# Patient Record
Sex: FEMALE | Race: WHITE | NOT HISPANIC OR LATINO | URBAN - METROPOLITAN AREA
[De-identification: names, ages, dates, MRNs, and addresses within clinical notes are randomized per-mention and may not be internally consistent; named-entity substitution may affect disease eponyms.]

---

## 2023-02-03 PROBLEM — F41.9 ANXIETY: Status: ACTIVE | Noted: 2023-02-03

## 2023-02-03 PROBLEM — J45.909 ASTHMA (HHS-HCC): Status: ACTIVE | Noted: 2023-02-03

## 2023-02-03 PROBLEM — J20.9 ACUTE BRONCHITIS WITH BRONCHOSPASM: Status: ACTIVE | Noted: 2023-02-03

## 2023-02-03 PROBLEM — G47.00 INSOMNIA: Status: ACTIVE | Noted: 2023-02-03

## 2023-02-03 PROBLEM — J30.2 SEASONAL ALLERGIES: Status: ACTIVE | Noted: 2023-02-03

## 2023-02-03 PROBLEM — K21.9 ESOPHAGEAL REFLUX: Status: ACTIVE | Noted: 2023-02-03

## 2023-02-03 PROBLEM — D50.9 IRON DEFICIENCY ANEMIA: Status: ACTIVE | Noted: 2023-02-03

## 2023-02-03 PROBLEM — E03.9 HYPOTHYROIDISM: Status: ACTIVE | Noted: 2023-02-03

## 2023-02-03 PROBLEM — E78.5 HYPERLIPIDEMIA: Status: ACTIVE | Noted: 2023-02-03

## 2023-02-03 RX ORDER — SERTRALINE HYDROCHLORIDE 25 MG/1
1 TABLET, FILM COATED ORAL DAILY
COMMUNITY
Start: 2019-01-18 | End: 2023-03-17 | Stop reason: SDUPTHER

## 2023-02-03 RX ORDER — ALBUTEROL SULFATE 90 UG/1
2 AEROSOL, METERED RESPIRATORY (INHALATION)
COMMUNITY
Start: 2020-01-21 | End: 2023-03-17 | Stop reason: SDUPTHER

## 2023-02-03 RX ORDER — LEVOTHYROXINE SODIUM 137 UG/1
1 TABLET ORAL DAILY
COMMUNITY
Start: 2019-05-08 | End: 2023-03-17 | Stop reason: SDUPTHER

## 2023-02-03 RX ORDER — ACETAMINOPHEN 500 MG
1 TABLET ORAL NIGHTLY
COMMUNITY
Start: 2020-01-21

## 2023-02-03 RX ORDER — FLUTICASONE PROPIONATE 50 MCG
2 SPRAY, SUSPENSION (ML) NASAL NIGHTLY
COMMUNITY
Start: 2020-01-21

## 2023-02-03 RX ORDER — SPIRONOLACT/HYDROCHLOROTHIAZID 25 MG-25MG
1 TABLET ORAL
COMMUNITY
Start: 2020-01-21

## 2023-03-17 ENCOUNTER — APPOINTMENT (OUTPATIENT)
Dept: PRIMARY CARE | Facility: CLINIC | Age: 58
End: 2023-03-17
Payer: COMMERCIAL

## 2023-03-17 ENCOUNTER — OFFICE VISIT (OUTPATIENT)
Dept: PRIMARY CARE | Facility: CLINIC | Age: 58
End: 2023-03-17

## 2023-03-17 VITALS
TEMPERATURE: 96.4 F | BODY MASS INDEX: 27.49 KG/M2 | WEIGHT: 192 LBS | OXYGEN SATURATION: 97 % | RESPIRATION RATE: 16 BRPM | HEART RATE: 76 BPM | HEIGHT: 70 IN | DIASTOLIC BLOOD PRESSURE: 84 MMHG | SYSTOLIC BLOOD PRESSURE: 142 MMHG

## 2023-03-17 DIAGNOSIS — Z12.31 BREAST CANCER SCREENING BY MAMMOGRAM: ICD-10-CM

## 2023-03-17 DIAGNOSIS — Z12.11 COLON CANCER SCREENING: ICD-10-CM

## 2023-03-17 DIAGNOSIS — Z13.1 DIABETES MELLITUS SCREENING: ICD-10-CM

## 2023-03-17 DIAGNOSIS — F41.9 ANXIETY: ICD-10-CM

## 2023-03-17 DIAGNOSIS — Z11.59 ENCOUNTER FOR HEPATITIS C SCREENING TEST FOR LOW RISK PATIENT: ICD-10-CM

## 2023-03-17 DIAGNOSIS — E03.9 HYPOTHYROIDISM, UNSPECIFIED TYPE: ICD-10-CM

## 2023-03-17 DIAGNOSIS — E78.2 MIXED HYPERLIPIDEMIA: ICD-10-CM

## 2023-03-17 DIAGNOSIS — D50.8 OTHER IRON DEFICIENCY ANEMIA: ICD-10-CM

## 2023-03-17 DIAGNOSIS — Z00.00 HEALTHCARE MAINTENANCE: Primary | ICD-10-CM

## 2023-03-17 DIAGNOSIS — Z11.4 ENCOUNTER FOR SCREENING FOR HIV: ICD-10-CM

## 2023-03-17 DIAGNOSIS — J45.909 ASTHMA, UNSPECIFIED ASTHMA SEVERITY, UNSPECIFIED WHETHER COMPLICATED, UNSPECIFIED WHETHER PERSISTENT (HHS-HCC): ICD-10-CM

## 2023-03-17 PROBLEM — J20.9 ACUTE BRONCHITIS WITH BRONCHOSPASM: Status: RESOLVED | Noted: 2023-02-03 | Resolved: 2023-03-17

## 2023-03-17 PROCEDURE — 99213 OFFICE O/P EST LOW 20 MIN: CPT

## 2023-03-17 PROCEDURE — 99396 PREV VISIT EST AGE 40-64: CPT

## 2023-03-17 PROCEDURE — 1036F TOBACCO NON-USER: CPT

## 2023-03-17 RX ORDER — SERTRALINE HYDROCHLORIDE 25 MG/1
25 TABLET, FILM COATED ORAL DAILY
Qty: 90 TABLET | Refills: 3 | Status: SHIPPED | OUTPATIENT
Start: 2023-03-17 | End: 2023-04-27 | Stop reason: SDUPTHER

## 2023-03-17 RX ORDER — LEVOTHYROXINE SODIUM 137 UG/1
137 TABLET ORAL
Qty: 90 TABLET | Refills: 3 | Status: SHIPPED | OUTPATIENT
Start: 2023-03-17 | End: 2024-03-18 | Stop reason: SDUPTHER

## 2023-03-17 RX ORDER — ALBUTEROL SULFATE 90 UG/1
2 AEROSOL, METERED RESPIRATORY (INHALATION) EVERY 4 HOURS PRN
Qty: 18 G | Refills: 3 | Status: SHIPPED | OUTPATIENT
Start: 2023-03-17 | End: 2024-03-18 | Stop reason: SDUPTHER

## 2023-03-17 SDOH — ECONOMIC STABILITY: FOOD INSECURITY: WITHIN THE PAST 12 MONTHS, YOU WORRIED THAT YOUR FOOD WOULD RUN OUT BEFORE YOU GOT MONEY TO BUY MORE.: NEVER TRUE

## 2023-03-17 SDOH — ECONOMIC STABILITY: FOOD INSECURITY: WITHIN THE PAST 12 MONTHS, THE FOOD YOU BOUGHT JUST DIDN'T LAST AND YOU DIDN'T HAVE MONEY TO GET MORE.: NEVER TRUE

## 2023-03-17 SDOH — HEALTH STABILITY: PHYSICAL HEALTH: ON AVERAGE, HOW MANY DAYS PER WEEK DO YOU ENGAGE IN MODERATE TO STRENUOUS EXERCISE (LIKE A BRISK WALK)?: 5 DAYS

## 2023-03-17 SDOH — HEALTH STABILITY: PHYSICAL HEALTH: ON AVERAGE, HOW MANY MINUTES DO YOU ENGAGE IN EXERCISE AT THIS LEVEL?: 30 MIN

## 2023-03-17 ASSESSMENT — ENCOUNTER SYMPTOMS
HEMATOLOGIC/LYMPHATIC NEGATIVE: 1
PSYCHIATRIC NEGATIVE: 1
MUSCULOSKELETAL NEGATIVE: 1
CONSTITUTIONAL NEGATIVE: 1
GASTROINTESTINAL NEGATIVE: 1
RESPIRATORY NEGATIVE: 1
CARDIOVASCULAR NEGATIVE: 1
EYES NEGATIVE: 1
ENDOCRINE NEGATIVE: 1
NEUROLOGICAL NEGATIVE: 1
STRESS: 1

## 2023-03-17 ASSESSMENT — PATIENT HEALTH QUESTIONNAIRE - PHQ9
SUM OF ALL RESPONSES TO PHQ9 QUESTIONS 1 & 2: 0
2. FEELING DOWN, DEPRESSED OR HOPELESS: NOT AT ALL
1. LITTLE INTEREST OR PLEASURE IN DOING THINGS: NOT AT ALL

## 2023-03-17 ASSESSMENT — PAIN SCALES - GENERAL: PAINLEVEL: 0-NO PAIN

## 2023-03-17 ASSESSMENT — LIFESTYLE VARIABLES
AUDIT-C TOTAL SCORE: 1
HOW OFTEN DO YOU HAVE SIX OR MORE DRINKS ON ONE OCCASION: NEVER
HOW MANY STANDARD DRINKS CONTAINING ALCOHOL DO YOU HAVE ON A TYPICAL DAY: 1 OR 2
HOW OFTEN DO YOU HAVE A DRINK CONTAINING ALCOHOL: MONTHLY OR LESS
SKIP TO QUESTIONS 9-10: 1

## 2023-03-17 NOTE — PATIENT INSTRUCTIONS
Thank you for coming to see me today.  If you have any questions or concerns following our visit, please contact the office.  Phone: [phone]    Follow up with me in 1 year or sooner as needed    1)  INCREASE sertraline to 1 whole tablet (25mg) per day.  If you start to feel more tired on the full tablet, call me and I can switch you to another medication    2) Get fasting lab work within the next 1-2 weeks.  The lab is down the foley from our office.     3) Please bring a copy of your mammogram report from [facility] to our office to be scanned into your medical record.      4) Check blood pressures at home 3-4 times per week, making sure you rest 5 minutes prior to taking a reading.  Write the readings down on blood pressure log and bring this log to your next visit with me. I think high blood pressure is due to high stress/anxiety.  Lets work together to get anxiety better controlled. If anxiety levels are controlled and your blood pressure is still high (top number 140 or higher consistently, bottom number 90 or higher consistently) please call me to let me know    5) Please get screening colonoscopy as you're able- call [phone] to schedule

## 2023-03-17 NOTE — PROGRESS NOTES
Subjective   Patient ID: Ebony Glez is a 57 y.o. female who presents for yearly annual follow up.    Stress  This is a chronic problem. The current episode started more than 1 year ago. The problem occurs constantly. The problem has been waxing and waning. The symptoms are aggravated by stress. She has tried relaxation (SSRI) for the symptoms. The treatment provided moderate relief.   Has severe panic attacks once per month,     Diet: Eating well balanced meal, thinks she should eat healthier.  Sweet thing a few times per week. 3 cups coffee in the morning, 4 cups makes her too jittery/nervous  Exercise: No regular exercise, extreme   Weight: Would like to lose weight, otherwise has been stable  Water: Carries water bottle around with her but still not drinking enough. Drinking 1 16 oz bottle per day  Sleep: Waking up around 3/4AM due to anxiety. Worse over the last 2 months. Getting about 7 hours sleep per night. Was taking naps  Social: , lives in a 2 floor home. 3 children, 4 grandchildren who live nearby  Professional: Self-employed, helping  with [employer].  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    HENT: Negative.     Eyes: Negative.    Respiratory: Negative.     Cardiovascular: Negative.    Gastrointestinal: Negative.    Endocrine: Negative.    Genitourinary: Negative.    Musculoskeletal: Negative.    Skin: Negative.    Neurological: Negative.    Hematological: Negative.    Psychiatric/Behavioral: Negative.          Current Outpatient Medications   Medication Sig Dispense Refill    ruiz mhbawa-Hm-qrdCuhglsyfc-tea (Apple Cider Vinegar Plus) [phone] mg-mcg-mg-mg tablet Take 1 tablet by mouth. 2 times daily      fluticasone (Flonase) 50 mcg/actuation nasal spray Administer 2 sprays into affected nostril(s) once daily at bedtime. INTRANASALLY      melatonin 5 mg tablet Take 1 tablet (5 mg) by mouth once  "daily at bedtime.      NON FORMULARY CBD  MG ONCE DAILY      albuterol 90 mcg/actuation inhaler Inhale 2 puffs every 4 hours if needed for wheezing. 4 times daily 18 g 3    levothyroxine (Synthroid, Levoxyl) 137 mcg tablet Take 1 tablet (137 mcg) by mouth once daily in the morning. Take before meals. 90 tablet 3    sertraline (Zoloft) 25 mg tablet Take 1 tablet (25 mg) by mouth once daily. 90 tablet 3     No current facility-administered medications for this visit.     Past Surgical History:   Procedure Laterality Date    OTHER SURGICAL HISTORY  01/21/2020    Dilation and curettage    OTHER SURGICAL HISTORY  01/21/2020    Bladder surgery    OTHER SURGICAL HISTORY  08/21/2020    Tubal ligation     Family History   Problem Relation Name Age of Onset    Hypertension Mother      Lung disease Father        Social History     Tobacco Use    Smoking status: Never    Smokeless tobacco: Never   Vaping Use    Vaping status: Never Used     Passive vaping exposure: Yes   Substance Use Topics    Alcohol use: Yes     Alcohol/week: 1.0 standard drink of alcohol     Types: 1 Glasses of wine per week    Drug use: Never        Objective     Visit Vitals  /84 (BP Location: Left arm, Patient Position: Sitting, BP Cuff Size: Adult)   Pulse 76   Temp 35.8 °C (96.4 °F) (Temporal)   Resp 16   Ht 1.778 m (5' 10\")   Wt 87.1 kg (192 lb)   SpO2 97%   BMI 27.55 kg/m²   Smoking Status Never   BSA 2.07 m²        Physical Exam  Constitutional:       Appearance: Normal appearance.   HENT:      Head: Normocephalic and atraumatic.   Eyes:      Extraocular Movements: Extraocular movements intact.      Pupils: Pupils are equal, round, and reactive to light.   Cardiovascular:      Rate and Rhythm: Normal rate and regular rhythm.   Pulmonary:      Effort: Pulmonary effort is normal.      Breath sounds: Normal breath sounds.   Abdominal:      General: Abdomen is flat. Bowel sounds are normal.      Palpations: Abdomen is soft. "   Musculoskeletal:         General: Normal range of motion.   Skin:     General: Skin is warm and dry.   Neurological:      General: No focal deficit present.      Mental Status: She is alert and oriented to person, place, and time.   Psychiatric:         Mood and Affect: Mood is anxious and elated.         Behavior: Behavior normal.           Assessment/Plan   Problem List Items Addressed This Visit       Anxiety     Increase sertraline to whole tablet, 25mg daily    Also hypertensive today at 142/84 possible s/t anxiety- advised to check blood pressures and monitor as stronger anxiety medicine takes effect         Relevant Medications    sertraline (Zoloft) 25 mg tablet    Other Relevant Orders    Comprehensive Metabolic Panel    Follow Up In Primary Care    Asthma    Relevant Medications    albuterol 90 mcg/actuation inhaler    Other Relevant Orders    Iron and TIBC    Ferritin    Hyperlipidemia    Relevant Orders    Lipid Panel    Follow Up In Primary Care    Hypothyroidism    Relevant Medications    levothyroxine (Synthroid, Levoxyl) 137 mcg tablet    Other Relevant Orders    TSH with reflex to Free T4 if abnormal    Follow Up In Primary Care    Iron deficiency anemia    Relevant Orders    CBC     Other Visit Diagnoses       Healthcare maintenance    -  Primary    Diabetes mellitus screening        Relevant Orders    Hemoglobin A1C    Colon cancer screening        Relevant Orders    Colonoscopy    Breast cancer screening by mammogram        Encounter for hepatitis C screening test for low risk patient        Relevant Orders    Hepatitis C Antibody    Encounter for screening for HIV        Relevant Orders    HIV 1/2 Antigen/Antibody Screen with Reflex to Confirmation          All pertinent lab work and results were reviewed with patient.     Follow up with me in 3-6 months or sooner as needed    CAMILLA Thorpe-CNS

## 2023-03-17 NOTE — ASSESSMENT & PLAN NOTE
Increase sertraline to whole tablet, 25mg daily    Also hypertensive today at 142/84 possible s/t anxiety- advised to check blood pressures and monitor as stronger anxiety medicine takes effect

## 2023-04-26 ENCOUNTER — TELEPHONE (OUTPATIENT)
Dept: PRIMARY CARE | Facility: CLINIC | Age: 58
End: 2023-04-26
Payer: COMMERCIAL

## 2023-04-26 NOTE — TELEPHONE ENCOUNTER
Pt states that Lizbeth told her to keep track of her blood pressure. Pt states it has been 154/95 or higher for the last two weeks. Pt says Lizbeth told her to call if it was high and she would call in a blood pressure medication for her. She would like to have that medication called in to [facility] in [place].

## 2023-04-27 DIAGNOSIS — I15.8 OTHER SECONDARY HYPERTENSION: Primary | ICD-10-CM

## 2023-04-27 DIAGNOSIS — F41.9 ANXIETY: ICD-10-CM

## 2023-04-27 RX ORDER — SERTRALINE HYDROCHLORIDE 50 MG/1
50 TABLET, FILM COATED ORAL DAILY
Qty: 90 TABLET | Refills: 1 | Status: SHIPPED | OUTPATIENT
Start: 2023-04-27 | End: 2024-03-18 | Stop reason: SDUPTHER

## 2023-04-27 RX ORDER — LOSARTAN POTASSIUM 25 MG/1
25 TABLET ORAL DAILY
Qty: 30 TABLET | Refills: 11 | Status: SHIPPED | OUTPATIENT
Start: 2023-04-27 | End: 2024-03-18 | Stop reason: SDUPTHER

## 2023-04-27 NOTE — TELEPHONE ENCOUNTER
Please call and let her know she needs to increase sertraline to 50mg daily to help lower anxiety which will help lower blood pressure (new script sent to [facility] for her) and also start losartan 25mg daily. I think that decreasing her stress will help lower her blood pressure but that this will help in the meantime.

## 2023-04-27 NOTE — PROGRESS NOTES
Patient called regarding hypertension 150s/90s persistently at home, likely s/t stress and anxiety. Will start her on losartan 25mg daily to lower blood pressure and increase sertraline to 50mg to assist with anxiety management.

## 2024-03-18 ENCOUNTER — OFFICE VISIT (OUTPATIENT)
Dept: PRIMARY CARE | Facility: CLINIC | Age: 59
End: 2024-03-18

## 2024-03-18 VITALS
TEMPERATURE: 96.6 F | WEIGHT: 183 LBS | RESPIRATION RATE: 18 BRPM | OXYGEN SATURATION: 99 % | BODY MASS INDEX: 26.2 KG/M2 | HEART RATE: 74 BPM | DIASTOLIC BLOOD PRESSURE: 82 MMHG | SYSTOLIC BLOOD PRESSURE: 138 MMHG | HEIGHT: 70 IN

## 2024-03-18 DIAGNOSIS — F41.9 ANXIETY: ICD-10-CM

## 2024-03-18 DIAGNOSIS — I15.8 OTHER SECONDARY HYPERTENSION: ICD-10-CM

## 2024-03-18 DIAGNOSIS — J45.909 ASTHMA, UNSPECIFIED ASTHMA SEVERITY, UNSPECIFIED WHETHER COMPLICATED, UNSPECIFIED WHETHER PERSISTENT (HHS-HCC): ICD-10-CM

## 2024-03-18 DIAGNOSIS — D50.9 IRON DEFICIENCY ANEMIA, UNSPECIFIED IRON DEFICIENCY ANEMIA TYPE: Primary | ICD-10-CM

## 2024-03-18 DIAGNOSIS — Z00.00 HEALTH CARE MAINTENANCE: ICD-10-CM

## 2024-03-18 DIAGNOSIS — E03.9 HYPOTHYROIDISM, UNSPECIFIED TYPE: ICD-10-CM

## 2024-03-18 DIAGNOSIS — E78.2 MIXED HYPERLIPIDEMIA: ICD-10-CM

## 2024-03-18 PROCEDURE — 99396 PREV VISIT EST AGE 40-64: CPT

## 2024-03-18 PROCEDURE — 1036F TOBACCO NON-USER: CPT

## 2024-03-18 PROCEDURE — 3079F DIAST BP 80-89 MM HG: CPT

## 2024-03-18 PROCEDURE — 3075F SYST BP GE 130 - 139MM HG: CPT

## 2024-03-18 RX ORDER — ALBUTEROL SULFATE 90 UG/1
2 AEROSOL, METERED RESPIRATORY (INHALATION) EVERY 4 HOURS PRN
Qty: 18 G | Refills: 3 | Status: SHIPPED | OUTPATIENT
Start: 2024-03-18

## 2024-03-18 RX ORDER — LOSARTAN POTASSIUM 25 MG/1
25 TABLET ORAL DAILY
Qty: 90 TABLET | Refills: 3 | Status: SHIPPED | OUTPATIENT
Start: 2024-03-18 | End: 2025-03-18

## 2024-03-18 RX ORDER — SERTRALINE HYDROCHLORIDE 50 MG/1
50 TABLET, FILM COATED ORAL DAILY
Qty: 90 TABLET | Refills: 3 | Status: SHIPPED | OUTPATIENT
Start: 2024-03-18

## 2024-03-18 RX ORDER — LEVOTHYROXINE SODIUM 137 UG/1
137 TABLET ORAL
Qty: 90 TABLET | Refills: 0 | Status: SHIPPED | OUTPATIENT
Start: 2024-03-18

## 2024-03-18 SDOH — ECONOMIC STABILITY: FOOD INSECURITY: WITHIN THE PAST 12 MONTHS, THE FOOD YOU BOUGHT JUST DIDN'T LAST AND YOU DIDN'T HAVE MONEY TO GET MORE.: NEVER TRUE

## 2024-03-18 SDOH — ECONOMIC STABILITY: FOOD INSECURITY: WITHIN THE PAST 12 MONTHS, YOU WORRIED THAT YOUR FOOD WOULD RUN OUT BEFORE YOU GOT MONEY TO BUY MORE.: NEVER TRUE

## 2024-03-18 ASSESSMENT — ENCOUNTER SYMPTOMS
GASTROINTESTINAL NEGATIVE: 1
MUSCULOSKELETAL NEGATIVE: 1
EYES NEGATIVE: 1
CARDIOVASCULAR NEGATIVE: 1
HEMATOLOGIC/LYMPHATIC NEGATIVE: 1
NEUROLOGICAL NEGATIVE: 1
RESPIRATORY NEGATIVE: 1
ENDOCRINE NEGATIVE: 1
PSYCHIATRIC NEGATIVE: 1
CONSTITUTIONAL NEGATIVE: 1

## 2024-03-18 ASSESSMENT — LIFESTYLE VARIABLES
HOW OFTEN DO YOU HAVE A DRINK CONTAINING ALCOHOL: MONTHLY OR LESS
HOW MANY STANDARD DRINKS CONTAINING ALCOHOL DO YOU HAVE ON A TYPICAL DAY: 1 OR 2
AUDIT-C TOTAL SCORE: 1
SKIP TO QUESTIONS 9-10: 1
HOW OFTEN DO YOU HAVE SIX OR MORE DRINKS ON ONE OCCASION: NEVER

## 2024-03-18 ASSESSMENT — PAIN SCALES - GENERAL: PAINLEVEL: 0-NO PAIN

## 2024-03-18 ASSESSMENT — PATIENT HEALTH QUESTIONNAIRE - PHQ9
SUM OF ALL RESPONSES TO PHQ9 QUESTIONS 1 & 2: 0
1. LITTLE INTEREST OR PLEASURE IN DOING THINGS: NOT AT ALL
2. FEELING DOWN, DEPRESSED OR HOPELESS: NOT AT ALL

## 2024-03-18 NOTE — PROGRESS NOTES
Subjective   Patient ID: Ebony Glez is a 58 y.o. female who presents for CPE.    Home blood pressures checked periodically which 130/75s, last checked a few weeks ago.     Deferring preventative screening due to self pay status, no insurance.     Diet: Eating well balanced meal, thinks she should eat healthier.  Sweet thing a few times per week. 3 cups coffee in the morning, 4 cups makes her too jittery/nervous  Exercise: No regular exercise, caring for her mother in law and 5 grandchildren  Weight: Would like to lose weight, otherwise has been stable  Water: Only drinking about 16 oz per day  Sleep: Waking up around 3/4AM due to anxiety. Worse over the last 2 months. Getting about 7 hours sleep per night. Was taking naps  Social: , lives in a 2 floor home. 3 children, 4 grandchildren who live nearby  Professional: Recently lost her job, mother in law living with her. Self-employed, helping  with [employer].  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    HENT: Negative.     Eyes: Negative.    Respiratory: Negative.     Cardiovascular: Negative.    Gastrointestinal: Negative.    Endocrine: Negative.    Genitourinary: Negative.    Musculoskeletal: Negative.    Skin: Negative.    Neurological: Negative.    Hematological: Negative.    Psychiatric/Behavioral: Negative.          Current Outpatient Medications   Medication Sig Dispense Refill    ruiz treewo-Xm-ibqGkqfabjar-tea (Apple Cider Vinegar Plus) [phone] mg-mcg-mg-mg tablet Take 1 tablet by mouth. 2 times daily      fluticasone (Flonase) 50 mcg/actuation nasal spray Administer 2 sprays into affected nostril(s) once daily at bedtime. INTRANASALLY      melatonin 5 mg tablet Take 1 tablet (5 mg) by mouth once daily at bedtime.      NON FORMULARY CBD  MG ONCE DAILY      albuterol 90 mcg/actuation inhaler Inhale 2 puffs every 4 hours if needed for wheezing. 4  "times daily 18 g 3    levothyroxine (Synthroid, Levoxyl) 137 mcg tablet Take 1 tablet (137 mcg) by mouth once daily in the morning. Take before meals. 90 tablet 0    losartan (Cozaar) 25 mg tablet Take 1 tablet (25 mg) by mouth once daily. 90 tablet 3    sertraline (Zoloft) 50 mg tablet Take 1 tablet (50 mg) by mouth once daily. 90 tablet 3     No current facility-administered medications for this visit.     Past Surgical History:   Procedure Laterality Date    OTHER SURGICAL HISTORY  01/21/2020    Dilation and curettage    OTHER SURGICAL HISTORY  01/21/2020    Bladder surgery    OTHER SURGICAL HISTORY  08/21/2020    Tubal ligation     Family History   Problem Relation Name Age of Onset    Hypertension Mother      Lung disease Father        Social History     Tobacco Use    Smoking status: Never    Smokeless tobacco: Never   Vaping Use    Vaping Use: Never used   Substance Use Topics    Alcohol use: Yes     Alcohol/week: 1.0 standard drink of alcohol     Types: 1 Glasses of wine per week    Drug use: Never        Objective     Visit Vitals  /82 (BP Location: Left arm, Patient Position: Sitting)   Pulse 74   Temp 35.9 °C (96.6 °F) (Temporal)   Resp 18   Ht 1.778 m (5' 10\")   Wt 83 kg (183 lb)   SpO2 99%   BMI 26.26 kg/m²   Smoking Status Never   BSA 2.02 m²        Physical Exam  Constitutional:       Appearance: Normal appearance.   HENT:      Head: Normocephalic and atraumatic.   Eyes:      Extraocular Movements: Extraocular movements intact.      Pupils: Pupils are equal, round, and reactive to light.   Cardiovascular:      Rate and Rhythm: Normal rate and regular rhythm.   Pulmonary:      Effort: Pulmonary effort is normal.      Breath sounds: Normal breath sounds.   Abdominal:      General: Abdomen is flat. Bowel sounds are normal.      Palpations: Abdomen is soft.   Musculoskeletal:         General: Normal range of motion.   Skin:     General: Skin is warm and dry.      Capillary Refill: Capillary refill " takes less than 2 seconds.   Neurological:      General: No focal deficit present.      Mental Status: She is alert and oriented to person, place, and time.   Psychiatric:         Mood and Affect: Mood normal.         Behavior: Behavior normal.           Assessment/Plan   Problem List Items Addressed This Visit       Anxiety    Relevant Medications    sertraline (Zoloft) 50 mg tablet    Other Relevant Orders    Vitamin D 25-Hydroxy,Total (for eval of Vitamin D levels)    Asthma    Relevant Medications    albuterol 90 mcg/actuation inhaler    Hyperlipidemia    Relevant Orders    CBC    Lipid Panel    Comprehensive Metabolic Panel    Hypothyroidism    Relevant Medications    levothyroxine (Synthroid, Levoxyl) 137 mcg tablet    Other Relevant Orders    TSH with reflex to Free T4 if abnormal    Iron deficiency anemia - Primary    Relevant Orders    Ferritin    Iron and TIBC    Health care maintenance     -Healthy diet, good quality and duration of sleep, healthy water intake, regular exercise and healthy weight discussed  -Immunizations:   Flu: Recommended annually  Shingrix: Recommended and deferred   Tdap: Recommended and deferred  -Following with dentistry and optometry regularly  -Colon cancer screening: Last in 2017, repeat study recommended. Patient declining at this time due to insurance coverage issues  -Mammogram/DEXA Recommended, deferred d/t insurance  -GYN: Following with [facility] on [address] [place]  -No concerns for anxiety/depression  -Tobacco never, EtOH 1 glass wine per night, No illicit/recreational drugs  -Feeling safe at home           Other Visit Diagnoses       Other secondary hypertension        Relevant Medications    losartan (Cozaar) 25 mg tablet            All pertinent lab work and results were reviewed with patient.     Follow up with me in 1 year     CAMILLA Thorpe-CNS

## 2024-03-18 NOTE — ASSESSMENT & PLAN NOTE
-Healthy diet, good quality and duration of sleep, healthy water intake, regular exercise and healthy weight discussed  -Immunizations:   Flu: Recommended annually  Shingrix: Recommended and deferred   Tdap: Recommended and deferred  -Following with dentistry and optometry regularly  -Colon cancer screening: Last in 2017, repeat study recommended. Patient declining at this time due to insurance coverage issues  -Mammogram/DEXA Recommended, deferred d/t insurance  -GYN: Following with [facility] on [address] [place]  -No concerns for anxiety/depression  -Tobacco never, EtOH 1 glass wine per night, No illicit/recreational drugs  -Feeling safe at home

## 2024-03-18 NOTE — PATIENT INSTRUCTIONS
Thank you for coming to see me today.  If you have any questions or concerns following our visit, please contact the office.  Phone: [phone]    Follow up with me in 6 months or sooner as needed    1)  Get fasting labwork in the next 1-2 weeks.  The lab is down the foley from our office.     2)  When insurance coverage allows please have colonscopy repeated. Let me know if you need updated order

## 2024-06-19 DIAGNOSIS — E03.9 HYPOTHYROIDISM, UNSPECIFIED TYPE: ICD-10-CM

## 2024-06-20 RX ORDER — LEVOTHYROXINE SODIUM 137 UG/1
137 TABLET ORAL
Qty: 90 TABLET | Refills: 2 | Status: SHIPPED | OUTPATIENT
Start: 2024-06-20

## 2024-12-28 ENCOUNTER — HOSPITAL ENCOUNTER (EMERGENCY)
Facility: HOSPITAL | Age: 59
Discharge: HOME | End: 2024-12-28
Attending: STUDENT IN AN ORGANIZED HEALTH CARE EDUCATION/TRAINING PROGRAM

## 2024-12-28 ENCOUNTER — APPOINTMENT (OUTPATIENT)
Dept: RADIOLOGY | Facility: HOSPITAL | Age: 59
End: 2024-12-28

## 2024-12-28 VITALS
WEIGHT: 185 LBS | HEART RATE: 73 BPM | HEIGHT: 69 IN | RESPIRATION RATE: 18 BRPM | TEMPERATURE: 97.5 F | BODY MASS INDEX: 27.4 KG/M2 | DIASTOLIC BLOOD PRESSURE: 124 MMHG | SYSTOLIC BLOOD PRESSURE: 193 MMHG | OXYGEN SATURATION: 100 %

## 2024-12-28 DIAGNOSIS — M25.511 ACUTE PAIN OF RIGHT SHOULDER: ICD-10-CM
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
## 2024-12-28 PROCEDURE — 96372 THER/PROPH/DIAG INJ SC/IM: CPT | Performed by: NURSE PRACTITIONER

## 2024-12-28 PROCEDURE — 73030 X-RAY EXAM OF SHOULDER: CPT | Mod: RT

## 2024-12-28 PROCEDURE — 73030 X-RAY EXAM OF SHOULDER: CPT | Mod: RIGHT SIDE | Performed by: STUDENT IN AN ORGANIZED HEALTH CARE EDUCATION/TRAINING PROGRAM

## 2024-12-28 PROCEDURE — 99284 EMERGENCY DEPT VISIT MOD MDM: CPT | Performed by: STUDENT IN AN ORGANIZED HEALTH CARE EDUCATION/TRAINING PROGRAM

## 2024-12-28 PROCEDURE — 2500000004 HC RX 250 GENERAL PHARMACY W/ HCPCS (ALT 636 FOR OP/ED): Performed by: NURSE PRACTITIONER

## 2024-12-28 RX ORDER — KETOROLAC TROMETHAMINE 30 MG/ML
30 INJECTION, SOLUTION INTRAMUSCULAR; INTRAVENOUS ONCE
Status: COMPLETED | OUTPATIENT
Start: 2024-12-28 | End: 2024-12-28

## 2024-12-28 RX ORDER — ORPHENADRINE CITRATE 30 MG/ML
60 INJECTION INTRAMUSCULAR; INTRAVENOUS ONCE
Status: COMPLETED | OUTPATIENT
Start: 2024-12-28 | End: 2024-12-28

## 2024-12-28 RX ADMIN — KETOROLAC TROMETHAMINE 30 MG: 30 INJECTION, SOLUTION INTRAMUSCULAR at 18:51

## 2024-12-28 RX ADMIN — ORPHENADRINE CITRATE 60 MG: 60 INJECTION INTRAMUSCULAR; INTRAVENOUS at 18:51

## 2024-12-28 ASSESSMENT — COLUMBIA-SUICIDE SEVERITY RATING SCALE - C-SSRS
6. HAVE YOU EVER DONE ANYTHING, STARTED TO DO ANYTHING, OR PREPARED TO DO ANYTHING TO END YOUR LIFE?: NO
2. HAVE YOU ACTUALLY HAD ANY THOUGHTS OF KILLING YOURSELF?: NO
1. IN THE PAST MONTH, HAVE YOU WISHED YOU WERE DEAD OR WISHED YOU COULD GO TO SLEEP AND NOT WAKE UP?: NO

## 2024-12-28 ASSESSMENT — PAIN - FUNCTIONAL ASSESSMENT: PAIN_FUNCTIONAL_ASSESSMENT: 0-10

## 2024-12-28 ASSESSMENT — PAIN SCALES - GENERAL: PAINLEVEL_OUTOF10: 3

## 2024-12-28 NOTE — ED PROVIDER NOTES
HPI   Chief Complaint   Patient presents with    right shoulder injury    Fall       59-year-old female presents the emergency department today for right shoulder pain.  Patient states that just prior to arrival she slipped in her driveway landing on the right side injuring her shoulder.  Did not hit her head or lose consciousness.  Patient is not on any blood thinners.  She is having a difficult time moving her right upper extremity due to the discomfort.  States when she is at rest she is not in any pain however with attempt with movement significantly worsens.  No elbow wrist or hand discomfort.  Denies any further pain or injury.  No numbness or tingling the extremity.                          [place] Coma Scale Score: 15                  Patient History   Past Medical History:   Diagnosis Date    Acute bronchitis with bronchospasm 02/03/2023    Allergic contact dermatitis due to plants, except food     Poison ivy    Anxiety 02/03/2023    Asthma 02/03/2023    Disease of thyroid gland     Esophageal reflux 02/03/2023    Hyperlipidemia 02/03/2023    Hypothyroidism 02/03/2023    Insomnia 02/03/2023    Iron deficiency anemia 02/03/2023    Personal history of other diseases of the respiratory system     History of upper respiratory infection    Personal history of other specified conditions     History of palpitations    Seasonal allergies 02/03/2023     Past Surgical History:   Procedure Laterality Date    OTHER SURGICAL HISTORY  01/21/2020    Dilation and curettage    OTHER SURGICAL HISTORY  01/21/2020    Bladder surgery    OTHER SURGICAL HISTORY  08/21/2020    Tubal ligation     Family History   Problem Relation Name Age of Onset    Hypertension Mother      Lung disease Father       Social History     Tobacco Use    Smoking status: Never    Smokeless tobacco: Never   Vaping Use    Vaping status: Never Used   Substance Use Topics    Alcohol use: Yes     Alcohol/week: 1.0 standard drink of alcohol     Types: 1  Glasses of wine per week    Drug use: Never       Physical Exam   ED Triage Vitals [12/28/24 1556]   Temperature Heart Rate Respirations BP   36.4 °C (97.5 °F) 86 20 (!) 183/124      Pulse Ox Temp src Heart Rate Source Patient Position   99 % -- -- --      BP Location FiO2 (%)     -- --       Physical Exam  Vitals reviewed.   Constitutional:       Appearance: Normal appearance.   HENT:      Head: Normocephalic.   Cardiovascular:      Rate and Rhythm: Normal rate and regular rhythm.   Pulmonary:      Effort: Pulmonary effort is normal.      Breath sounds: Normal breath sounds.   Abdominal:      General: Abdomen is flat.      Palpations: Abdomen is soft.   Musculoskeletal:      Right shoulder: Swelling, tenderness and bony tenderness present. Decreased range of motion. Normal strength. Normal pulse.      Right elbow: Normal.      Right wrist: Normal.      Right hand: Normal.   Skin:     General: Skin is warm and dry.      Capillary Refill: Capillary refill takes less than 2 seconds.   Neurological:      Mental Status: She is alert.         Labs Reviewed - No data to display  Pain Management Panel           No data to display              XR shoulder right 2+ views    (Results Pending)       ED Course & MDM   Diagnoses as of 12/28/24 1845   Fall, initial encounter   Acute pain of right shoulder       Medical Decision Making  On initial evaluation patient is well-appearing in the emergency department at this time.  Physical exam patient is having discomfort in the right shoulder.  There is swelling of the right shoulder.  Patient is able to reach and touch her left shoulder.  She has a difficult time externally rotating but is able to move centrally.  X-rays were obtained which show no evidence of acute fracture or dislocation.  We did review the x-rays as well with the Y view they were concerned however with the physical exam not clinically dislocated.  Patient is having spasms in the bicep on exam.  Was given Toradol  and Norflex in the ED.  Hypertensive initially with improvement on reevaluation.  Discussed results in depth with patient and significant other who are at bedside.  Educated her on strict return precautions and outpatient follow-up.  She was placed in a sling educated on removing sling to reduce frozen shoulder.  Patient verbalized understanding of the plan has no further questions or concerns and she will be discharged home in improved condition.        Procedure  Procedures      I discussed the differential, results and discharge plan with the patient and/or family/friend/caregiver if present.  I emphasized the importance of follow-up with the physician I referred them to in the timeframe recommended.  I explained reasons for the patient to return to the Emergency Department. Additional verbal discharge instructions were also given and discussed with the patient to supplement those generated by the EMR. We also discussed medications that were prescribed (if any) including common side effects and interactions. The patient was advised to abstain from driving, operating heavy machinery or making significant decisions while taking medications such as opiates and muscle relaxers that may impair this. All questions were addressed.  They understand return precautions and discharge instructions. The patient and/or family/friend/caregiver expressed understanding.           Ileana Padilla, APRN-CNP  12/28/24 3575

## 2025-03-18 ENCOUNTER — APPOINTMENT (OUTPATIENT)
Dept: PRIMARY CARE | Facility: CLINIC | Age: 60
End: 2025-03-18

## 2025-03-18 VITALS
WEIGHT: 183 LBS | HEIGHT: 69 IN | DIASTOLIC BLOOD PRESSURE: 84 MMHG | BODY MASS INDEX: 27.11 KG/M2 | TEMPERATURE: 97.1 F | HEART RATE: 78 BPM | SYSTOLIC BLOOD PRESSURE: 154 MMHG | OXYGEN SATURATION: 97 % | RESPIRATION RATE: 18 BRPM

## 2025-03-18 DIAGNOSIS — Z00.00 HEALTH CARE MAINTENANCE: ICD-10-CM

## 2025-03-18 DIAGNOSIS — J45.909 ASTHMA, UNSPECIFIED ASTHMA SEVERITY, UNSPECIFIED WHETHER COMPLICATED, UNSPECIFIED WHETHER PERSISTENT (HHS-HCC): ICD-10-CM

## 2025-03-18 DIAGNOSIS — F41.9 ANXIETY: ICD-10-CM

## 2025-03-18 DIAGNOSIS — E03.9 HYPOTHYROIDISM, UNSPECIFIED TYPE: ICD-10-CM

## 2025-03-18 DIAGNOSIS — I15.8 OTHER SECONDARY HYPERTENSION: ICD-10-CM

## 2025-03-18 DIAGNOSIS — I10 PRIMARY HYPERTENSION: ICD-10-CM

## 2025-03-18 DIAGNOSIS — E78.2 MIXED HYPERLIPIDEMIA: Primary | ICD-10-CM

## 2025-03-18 PROBLEM — R03.0 ELEVATED BLOOD PRESSURE READING: Status: ACTIVE | Noted: 2025-03-18

## 2025-03-18 PROCEDURE — 3077F SYST BP >= 140 MM HG: CPT

## 2025-03-18 PROCEDURE — 3008F BODY MASS INDEX DOCD: CPT

## 2025-03-18 PROCEDURE — 3079F DIAST BP 80-89 MM HG: CPT

## 2025-03-18 PROCEDURE — 99212 OFFICE O/P EST SF 10 MIN: CPT

## 2025-03-18 PROCEDURE — 1036F TOBACCO NON-USER: CPT

## 2025-03-18 RX ORDER — SERTRALINE HYDROCHLORIDE 25 MG/1
25 TABLET, FILM COATED ORAL DAILY
Qty: 90 TABLET | Refills: 3 | Status: SHIPPED | OUTPATIENT
Start: 2025-03-18

## 2025-03-18 RX ORDER — ALBUTEROL SULFATE 90 UG/1
2 INHALANT RESPIRATORY (INHALATION) EVERY 4 HOURS PRN
Qty: 18 G | Refills: 3 | Status: SHIPPED | OUTPATIENT
Start: 2025-03-18

## 2025-03-18 RX ORDER — LOSARTAN POTASSIUM 50 MG/1
50 TABLET ORAL DAILY
Qty: 90 TABLET | Refills: 3 | Status: SHIPPED | OUTPATIENT
Start: 2025-03-18 | End: 2026-03-18

## 2025-03-18 RX ORDER — LEVOTHYROXINE SODIUM 137 UG/1
137 TABLET ORAL
Qty: 90 TABLET | Refills: 3 | Status: SHIPPED | OUTPATIENT
Start: 2025-03-18

## 2025-03-18 SDOH — ECONOMIC STABILITY: FOOD INSECURITY: WITHIN THE PAST 12 MONTHS, YOU WORRIED THAT YOUR FOOD WOULD RUN OUT BEFORE YOU GOT MONEY TO BUY MORE.: NEVER TRUE

## 2025-03-18 SDOH — ECONOMIC STABILITY: FOOD INSECURITY: WITHIN THE PAST 12 MONTHS, THE FOOD YOU BOUGHT JUST DIDN'T LAST AND YOU DIDN'T HAVE MONEY TO GET MORE.: NEVER TRUE

## 2025-03-18 ASSESSMENT — PATIENT HEALTH QUESTIONNAIRE - PHQ9
SUM OF ALL RESPONSES TO PHQ9 QUESTIONS 1 & 2: 1
2. FEELING DOWN, DEPRESSED OR HOPELESS: SEVERAL DAYS
1. LITTLE INTEREST OR PLEASURE IN DOING THINGS: NOT AT ALL
10. IF YOU CHECKED OFF ANY PROBLEMS, HOW DIFFICULT HAVE THESE PROBLEMS MADE IT FOR YOU TO DO YOUR WORK, TAKE CARE OF THINGS AT HOME, OR GET ALONG WITH OTHER PEOPLE: SOMEWHAT DIFFICULT

## 2025-03-18 ASSESSMENT — ENCOUNTER SYMPTOMS
ENDOCRINE NEGATIVE: 1
MUSCULOSKELETAL NEGATIVE: 1
NEUROLOGICAL NEGATIVE: 1
CONSTITUTIONAL NEGATIVE: 1
OCCASIONAL FEELINGS OF UNSTEADINESS: 0
PSYCHIATRIC NEGATIVE: 1
HEMATOLOGIC/LYMPHATIC NEGATIVE: 1
GASTROINTESTINAL NEGATIVE: 1
EYES NEGATIVE: 1
RESPIRATORY NEGATIVE: 1
CARDIOVASCULAR NEGATIVE: 1
LOSS OF SENSATION IN FEET: 0
DEPRESSION: 0

## 2025-03-18 ASSESSMENT — LIFESTYLE VARIABLES
AUDIT-C TOTAL SCORE: 0
HOW MANY STANDARD DRINKS CONTAINING ALCOHOL DO YOU HAVE ON A TYPICAL DAY: PATIENT DOES NOT DRINK
HOW OFTEN DO YOU HAVE SIX OR MORE DRINKS ON ONE OCCASION: NEVER
HOW OFTEN DO YOU HAVE A DRINK CONTAINING ALCOHOL: NEVER
SKIP TO QUESTIONS 9-10: 1

## 2025-03-18 ASSESSMENT — PAIN SCALES - GENERAL: PAINLEVEL_OUTOF10: 7

## 2025-03-18 NOTE — ASSESSMENT & PLAN NOTE
Blood pressure 154/84 in office today  Home blood pressures running 130-160/80-90s, mostly  140-150/80-90s    Increase losartan to 50mg daily  Advised checking blood pressures at home, record the values and drop off/MyChart message me a log in 1 month

## 2025-03-18 NOTE — ASSESSMENT & PLAN NOTE
Healthy diet, good quality and duration of sleep, healthy water intake, regular exercise and healthy weight discussed  -Immunizations:   Flu: Recommended annually  Shingrix: Recommended and deferred   Tdap: Recommended and deferred  -Following with dentistry and optometry regularly  -Colon cancer screening: Last in 2017, repeat study recommended. Patient declining at this time due to insurance coverage issues  -Mammogram/DEXA Recommended, deferred d/t insurance  -GYN: Following with [facility] on [address]  -No concerns for anxiety/depression  -Tobacco never, EtOH 1 glass wine per night, No illicit/recreational drugs  -Feeling safe at home

## 2025-03-18 NOTE — PROGRESS NOTES
Subjective   Patient ID: Ebony Glez is a 59 y.o. female who presents for CPE.    Home blood pressures checked periodically, has recently fallen out of the habit. Home blood pressures 130-160/80-90 mostly 140-150/80-90s. Haven't checked blood pressures for about 2 weeks.      Diet: Eating well balanced meal, thinks she should eat healthier.  Eating veggies a few time per week. Has cut back on sweets, having rarely. 3 cups coffee in the morning, 4 cups makes her too jittery/nervous  Exercise: No regular exercise, caring for her mother in law and 5 grandchildren. Hurt her shoulder in December, has a rowing machine but not using regularly  Weight: Would like to lose weight, otherwise has been stable  Water: Only drinking about 16 oz per day  Sleep: Waking up around 3/4AM due to anxiety. Worse over the last 2 months. Getting about 7 hours sleep per night. Was taking naps  Social: , lives in a 2 floor home. 3 children, 4 grandchildren who live nearby  Professional: Recently lost her job, mother in law living with her. Self-employed, helping  with [employer].  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    HENT: Negative.     Eyes: Negative.    Respiratory: Negative.     Cardiovascular: Negative.    Gastrointestinal: Negative.    Endocrine: Negative.    Genitourinary: Negative.    Musculoskeletal: Negative.    Skin: Negative.    Neurological: Negative.    Hematological: Negative.    Psychiatric/Behavioral: Negative.          Current Outpatient Medications   Medication Sig Dispense Refill    ruiz lhporl-Gh-codMktncxnsp-tea (Apple Cider Vinegar Plus) [phone] mg-mcg-mg-mg tablet Take 1 tablet by mouth. 2 times daily      fluticasone (Flonase) 50 mcg/actuation nasal spray Administer 2 sprays into affected nostril(s) once daily at bedtime. INTRANASALLY      melatonin 5 mg tablet Take 1 tablet (5 mg) by mouth once daily at  "bedtime.      NON FORMULARY CBD  MG ONCE DAILY      albuterol 90 mcg/actuation inhaler Inhale 2 puffs every 4 hours if needed for wheezing. 4 times daily 18 g 3    levothyroxine (Synthroid, Levoxyl) 137 mcg tablet Take 1 tablet (137 mcg) by mouth once daily in the morning. Take before meals. 90 tablet 3    losartan (Cozaar) 50 mg tablet Take 1 tablet (50 mg) by mouth once daily. 90 tablet 3    sertraline (Zoloft) 25 mg tablet Take 1 tablet (25 mg) by mouth once daily. 90 tablet 3     No current facility-administered medications for this visit.     Past Surgical History:   Procedure Laterality Date    OTHER SURGICAL HISTORY  01/21/2020    Dilation and curettage    OTHER SURGICAL HISTORY  01/21/2020    Bladder surgery    OTHER SURGICAL HISTORY  08/21/2020    Tubal ligation     Family History   Problem Relation Name Age of Onset    Hypertension Mother      Lung disease Father        Social History     Tobacco Use    Smoking status: Never    Smokeless tobacco: Never   Vaping Use    Vaping status: Never Used   Substance Use Topics    Alcohol use: Yes     Alcohol/week: 1.0 standard drink of alcohol     Types: 1 Glasses of wine per week    Drug use: Never        Objective     Visit Vitals  /84 (BP Location: Left arm, Patient Position: Sitting, BP Cuff Size: Adult)   Pulse 78   Temp 36.2 °C (97.1 °F)   Resp 18   Ht 1.753 m (5' 9\")   Wt 83 kg (183 lb)   SpO2 97%   BMI 27.02 kg/m²   Smoking Status Never   BSA 2.01 m²        Physical Exam      Assessment/Plan   Problem List Items Addressed This Visit       Anxiety     Has been able to decrease sertraline to 25mg daily  Continue current therapy         Relevant Medications    sertraline (Zoloft) 25 mg tablet    Asthma    Relevant Medications    albuterol 90 mcg/actuation inhaler    Hyperlipidemia - Primary     Lipid panel from 9/2024 with , , Slava 164, HDL 49, Chol/HDLC ratio 5.4 (H), non HDL cholesterol 214 (H)   Labs are 6 months old, has made some " dietary changes over the last few months.   Would like to repeat lipid panel prior to initiation of statin therapy    Repeat lipid panel  If LDL remains elevated, prefers natural alternative. Recommend red yeast rice 600mg TID  Repeat lipid panel 3 months after that          Relevant Orders    Lipid Panel    Hypothyroidism     TSH elevated on OSH labs on 9/10/2024 at 5.23, T4 in normal rante at 1.1  Not feeling cold, no worsening hair loss    Would like to continue current dose of levothyroxine 137mcg daily  Repeat TSH in the next few weeks  If TSH continues to increase, consider dose increase to 150mcg daily         Relevant Medications    levothyroxine (Synthroid, Levoxyl) 137 mcg tablet    Other Relevant Orders    TSH with reflex to Free T4 if abnormal    Health care maintenance     Healthy diet, good quality and duration of sleep, healthy water intake, regular exercise and healthy weight discussed  -Immunizations:   Flu: Recommended annually  Shingrix: Recommended and deferred   Tdap: Recommended and deferred  -Following with dentistry and optometry regularly  -Colon cancer screening: Last in 2017, repeat study recommended. Patient declining at this time due to insurance coverage issues  -Mammogram/DEXA Recommended, deferred d/t insurance  -GYN: Following with [facility] on [address]  -No concerns for anxiety/depression  -Tobacco never, EtOH 1 glass wine per night, No illicit/recreational drugs  -Feeling safe at home          Primary hypertension     Blood pressure 154/84 in office today  Home blood pressures running 130-160/80-90s, mostly  140-150/80-90s    Increase losartan to 50mg daily  Advised checking blood pressures at home, record the values and drop off/MyChart message me a log in 1 month            Other Visit Diagnoses       Other secondary hypertension        Relevant Medications    losartan (Cozaar) 50 mg tablet            All pertinent lab work and results were reviewed with patient.     Follow  up with me in 1 year     Lizbeth Pal, APRN-CNS

## 2025-03-18 NOTE — ASSESSMENT & PLAN NOTE
TSH elevated on OSH labs on 9/10/2024 at 5.23, T4 in normal rante at 1.1  Not feeling cold, no worsening hair loss    Would like to continue current dose of levothyroxine 137mcg daily  Repeat TSH in the next few weeks  If TSH continues to increase, consider dose increase to 150mcg daily

## 2025-03-18 NOTE — PATIENT INSTRUCTIONS
Thank you for coming to see me today.  If you have any questions or concerns following our visit, please contact the office.  Phone: [phone]    Follow up with me in 1 year or sooner as needed    1)  Get fasting labwork in the next 1-2 weeks to check cholesterol and thyroid levels.  The lab is down the foley from our office.     2) INCREASE losartan to 50mg daily to help lower blood pressures    3) Continue checking blood pressures 3-4 times per week, record values and send me a Cl[employer] App message in 1 month or drop the log off so I can see if we need to make further medication adjustments     4) If cholesterol remains elevated, consider starting red yeast rice 600mg three times daily. Also consider implementing mediterranean diet eating pattern to help lower cholesterol naturally    5) Sign up for medicaid if you qualify to help cover medical expenses

## 2025-03-18 NOTE — ASSESSMENT & PLAN NOTE
Lipid panel from 9/2024 with , , Slava 164, HDL 49, Chol/HDLC ratio 5.4 (H), non HDL cholesterol 214 (H)   Labs are 6 months old, has made some dietary changes over the last few months.   Would like to repeat lipid panel prior to initiation of statin therapy    Repeat lipid panel  If LDL remains elevated, prefers natural alternative. Recommend red yeast rice 600mg TID  Repeat lipid panel 3 months after that

## 2025-07-08 DIAGNOSIS — Z12.31 ENCOUNTER FOR SCREENING MAMMOGRAM FOR BREAST CANCER: ICD-10-CM
